# Patient Record
Sex: FEMALE | Race: BLACK OR AFRICAN AMERICAN | URBAN - METROPOLITAN AREA
[De-identification: names, ages, dates, MRNs, and addresses within clinical notes are randomized per-mention and may not be internally consistent; named-entity substitution may affect disease eponyms.]

---

## 2012-11-12 LAB — MICROALBUMIN UR TEST STR-MCNC: NORMAL MG/DL

## 2019-02-04 ENCOUNTER — OFFICE VISIT (OUTPATIENT)
Dept: SURGERY | Age: 34
End: 2019-02-04

## 2019-02-04 VITALS
HEART RATE: 113 BPM | OXYGEN SATURATION: 97 % | TEMPERATURE: 98.3 F | SYSTOLIC BLOOD PRESSURE: 136 MMHG | HEIGHT: 64 IN | RESPIRATION RATE: 20 BRPM | BODY MASS INDEX: 50.02 KG/M2 | DIASTOLIC BLOOD PRESSURE: 83 MMHG | WEIGHT: 293 LBS

## 2019-02-04 DIAGNOSIS — E66.01 MORBID OBESITY WITH BMI OF 50.0-59.9, ADULT (HCC): Primary | ICD-10-CM

## 2019-02-04 RX ORDER — ACETAMINOPHEN 500 MG
TABLET ORAL
COMMUNITY

## 2019-02-04 RX ORDER — ACETAMINOPHEN 325 MG/1
TABLET, FILM COATED ORAL
COMMUNITY
End: 2019-02-04

## 2019-02-04 RX ORDER — IBUPROFEN 200 MG
TABLET ORAL
COMMUNITY

## 2019-02-04 NOTE — PROGRESS NOTES
1. Have you been to the ER, urgent care clinic since your last visit? Hospitalized since your last visit? No 
 
2. Have you seen or consulted any other health care providers outside of the [address] [place] [address] since your last visit? Include any pap smears or colon screening. No  
 
Akila Borden Body composition 
 
female 
34 y.o. Vitals:  
 02/04/19 1523 BP: 136/83 Pulse: (!) 113 Resp: 20 Temp: 98.3 °F (36.8 °C) TempSrc: Oral  
SpO2: 97% Weight: 336 lb 12.8 oz (152.8 kg) Height: 5' 4\" (1.626 m) Body mass index is 57.81 kg/m². Honora Ely Neck-17.5 inches Waist-60.5 inches Hips-60.5 inches

## 2019-02-05 NOTE — PROGRESS NOTES
Bariatric Surgery Consult Red Baker is a 34 y.o. female with a history of morbid obesity. Her Height: 5' 4\" (162.6 cm), Weight: 336 lb 12.8 oz (152.8 kg). Body mass index is 57.81 kg/m². She reports that she has been trying to lose weight for 5 years. Her maximum weight was 336 pounds. She has attended our bariatric surgery information seminar. Wenda Person wants to consider laparoscopic sleeve gastrectomy. Pt is referred by:  None. Dietary History:  
The patient says that in the past, unsupervised diets, Weight Watchers and Atkins have not resulted in real success. When asked why she was not able to achieve or maintain significant weight loss she replied, \"She has been able to lose up to 60lbs once with dieting and exercise but was only able to keep the weight off for 9 months. \". Number of meals per day: 2 Portion size: large Snacks: a few times daily Comorbidities:  
 
Bariatric comorbidities present: none Ambulatory status: independent The patient's reported level of exercise: very active. There are no active problems to display for this patient. Past Medical History:  
Diagnosis Date  Anemia  Asthma   
 Headache  Morbid obesity ([address] Past Surgical History:  
Procedure Laterality Date  HX  SECTION Social History Tobacco Use  Smoking status: Former Smoker Last attempt to quit: 2016 Years since quittin.2  Smokeless tobacco: Never Used Substance Use Topics  Alcohol use: No  
  Frequency: Never Family History Problem Relation Age of Onset  Hypertension Mother  Diabetes Mother  Hypertension Father  Diabetes Father  Stroke Maternal Grandmother  Heart Attack Maternal Grandmother  Psychiatric Disorder Paternal Grandmother Vibha Hinojosa Current Outpatient Medications Medication Sig  ibuprofen (MOTRIN) 200 mg tablet Take  by mouth.  acetaminophen (TYLENOL EXTRA STRENGTH) 500 mg tablet Take  by mouth every six (6) hours as needed for Pain. No current facility-administered medications for this visit. Not on File Review of Systems:   
Constitutional: negative Ears, Nose, Mouth, Throat, and Face: negative Respiratory: negative Cardiovascular: negative Gastrointestinal: negative Genitourinary:negative Integument/Breast: negative Hematologic/Lymphatic: negative Musculoskeletal:negative Neurological: negative Behavioral/Psychiatric: negative Endocrine: negative Allergic/Immunologic: negative Objective:  
 
Visit Vitals /83 Pulse (!) 113 Temp 98.3 °F (36.8 °C) (Oral) Resp 20 Ht 5' 4\" (1.626 m) Wt 336 lb 12.8 oz (152.8 kg) SpO2 97% BMI 57.81 kg/m² Physical Exam: 
 
General:  alert, no distress, morbidly obese Eyes:  conjunctivae and sclerae normal, pupils equal, round, reactive to light, extraocular movements intact without nystagmus Throat & Neck: no erythema or exudates noted and neck supple and symmetrical; no palpable masses Lungs:   clear to auscultation bilaterally Heart:  Regular rate and rhythm Abdomen:   obese, soft, nontender, nondistended, no masses or organomegaly, Extremities: no edema,  no gait disturbances Skin: Normal.  
 
 
Assessment:  
 
1. Morbid obesity (Body mass index is 57.81 kg/m².) with multiple comorbidities. The patient meets criteria established by the [employer] for weight loss surgery candidates. Without weight reduction, co-morbidities will escalate as well as increase risk of early mortality. Our recommendation is the patient could be served with laparoscopic sleeve gastrectomy.  I explained to the patient differences between laparoscopic gastric bypass, laparoscopic adjustable gastric banding, and laparoscopic vertical sleeve gastrectomy with respect to expected weight loss, resolution of comorbidities and risks. Ms. Keshia Langford has attended one our informational meetings and has seen our educational materials. She has requested Dr. Charlotte Jean to perform her procedure. I reviewed the role for this procedure as a tool to help her achieve her weight loss goals. I reminded her that effective weight loss comes from lifelong adherence to changes in dietary choices, eating habits and exercise. Recommendation: We will request approval for laparoscopic sleeve gastrectomy. We recommend that the patient undergo the following evaluations prior to considering surgery: 
 
Cardiology: no 
Dietician: yes Gastroenterology: no 
Psychiatry/Psychology: yes Pulmonology: no 
Sleep Medicine: no She is good candidate for sleeve gastrectomy. She has no GERD. She is motivated and active and thinks she can lose enough weight with the sleeve gastrectomy. Signed By: Dereck Boyer MD   
 February 4, 2019 Greater than half of the time: 30 minutes was used in counciling the patient about bariatric surgery and the steps she needs to take to move forward with her surgery. Ms. Keshia Langford has a reminder for a \"due or due soon\" health maintenance. I have asked that she contact her primary care provider for follow-up on this health maintenance.

## 2019-06-17 LAB — PAP SMEAR, EXTERNAL: NORMAL

## 2020-01-11 ENCOUNTER — ED HISTORICAL/CONVERTED ENCOUNTER (OUTPATIENT)
Dept: OTHER | Age: 35
End: 2020-01-11

## 2020-01-14 ENCOUNTER — ED HISTORICAL/CONVERTED ENCOUNTER (OUTPATIENT)
Dept: OTHER | Age: 35
End: 2020-01-14

## 2020-10-16 VITALS
WEIGHT: 288 LBS | BODY MASS INDEX: 47.98 KG/M2 | HEIGHT: 65 IN | SYSTOLIC BLOOD PRESSURE: 140 MMHG | DIASTOLIC BLOOD PRESSURE: 84 MMHG

## 2020-10-16 PROBLEM — N92.6 IRREGULAR PERIODS: Status: ACTIVE | Noted: 2020-10-16

## 2020-10-16 RX ORDER — FLUCONAZOLE 150 MG/1
150 TABLET ORAL DAILY
COMMUNITY

## 2020-10-16 RX ORDER — ONDANSETRON 4 MG/1
4 TABLET, ORALLY DISINTEGRATING ORAL
COMMUNITY

## 2020-10-16 RX ORDER — OLANZAPINE 5 MG/1
TABLET ORAL
COMMUNITY

## 2020-10-16 RX ORDER — DEXAMETHASONE 4 MG/1
TABLET ORAL EVERY 12 HOURS
COMMUNITY

## 2020-10-16 RX ORDER — ESTRADIOL 0.05 MG/D
1 FILM, EXTENDED RELEASE TRANSDERMAL
COMMUNITY

## 2020-10-16 RX ORDER — SULFAMETHOXAZOLE AND TRIMETHOPRIM 400; 80 MG/1; MG/1
1 TABLET ORAL 2 TIMES DAILY
COMMUNITY

## 2020-10-16 RX ORDER — POLYETHYLENE GLYCOL 3350 17 G/17G
17 POWDER, FOR SOLUTION ORAL DAILY
COMMUNITY

## 2020-10-16 RX ORDER — ENOXAPARIN SODIUM 100 MG/ML
40 INJECTION SUBCUTANEOUS
COMMUNITY

## 2020-10-16 RX ORDER — OXYCODONE HYDROCHLORIDE 5 MG/1
5 TABLET ORAL
COMMUNITY

## 2022-03-20 PROBLEM — N92.6 IRREGULAR PERIODS: Status: ACTIVE | Noted: 2020-10-16

## 2023-05-17 RX ORDER — OLANZAPINE 5 MG/1
TABLET ORAL
COMMUNITY

## 2023-05-17 RX ORDER — ACETAMINOPHEN 500 MG
TABLET ORAL EVERY 6 HOURS PRN
COMMUNITY

## 2023-05-17 RX ORDER — SULFAMETHOXAZOLE AND TRIMETHOPRIM 400; 80 MG/1; MG/1
1 TABLET ORAL 2 TIMES DAILY
COMMUNITY

## 2023-05-17 RX ORDER — FLUCONAZOLE 150 MG/1
150 TABLET ORAL DAILY
COMMUNITY

## 2023-05-17 RX ORDER — IBUPROFEN 200 MG
TABLET ORAL
COMMUNITY

## 2023-05-17 RX ORDER — ONDANSETRON 4 MG/1
4 TABLET, ORALLY DISINTEGRATING ORAL EVERY 8 HOURS PRN
COMMUNITY

## 2023-05-17 RX ORDER — DEXAMETHASONE 4 MG/1
TABLET ORAL EVERY 12 HOURS
COMMUNITY

## 2023-05-17 RX ORDER — ENOXAPARIN SODIUM 100 MG/ML
40 INJECTION SUBCUTANEOUS
COMMUNITY

## 2023-05-17 RX ORDER — OXYCODONE HYDROCHLORIDE 5 MG/1
5 TABLET ORAL EVERY 4 HOURS PRN
COMMUNITY

## 2023-05-17 RX ORDER — ESTRADIOL 0.05 MG/D
1 PATCH, EXTENDED RELEASE TRANSDERMAL
COMMUNITY

## 2023-05-17 RX ORDER — POLYETHYLENE GLYCOL 3350 17 G/17G
17 POWDER, FOR SOLUTION ORAL DAILY
COMMUNITY